# Patient Record
Sex: FEMALE | Race: WHITE | Employment: PART TIME | URBAN - NONMETROPOLITAN AREA
[De-identification: names, ages, dates, MRNs, and addresses within clinical notes are randomized per-mention and may not be internally consistent; named-entity substitution may affect disease eponyms.]

---

## 2018-02-16 ENCOUNTER — OFFICE VISIT (OUTPATIENT)
Dept: FAMILY MEDICINE | Facility: OTHER | Age: 23
End: 2018-02-16
Attending: NURSE PRACTITIONER
Payer: COMMERCIAL

## 2018-02-16 VITALS
BODY MASS INDEX: 25.11 KG/M2 | DIASTOLIC BLOOD PRESSURE: 68 MMHG | HEART RATE: 76 BPM | WEIGHT: 175.38 LBS | TEMPERATURE: 97.7 F | SYSTOLIC BLOOD PRESSURE: 120 MMHG | HEIGHT: 70 IN

## 2018-02-16 DIAGNOSIS — H92.02 ACUTE EAR PAIN, LEFT: ICD-10-CM

## 2018-02-16 DIAGNOSIS — R07.0 THROAT PAIN: ICD-10-CM

## 2018-02-16 DIAGNOSIS — H66.002 LEFT ACUTE SUPPURATIVE OTITIS MEDIA: Primary | ICD-10-CM

## 2018-02-16 PROCEDURE — 99213 OFFICE O/P EST LOW 20 MIN: CPT | Performed by: NURSE PRACTITIONER

## 2018-02-16 RX ORDER — HYDROXYZINE HYDROCHLORIDE 50 MG/1
TABLET, FILM COATED ORAL
Refills: 0 | COMMUNITY
Start: 2018-01-31

## 2018-02-16 RX ORDER — AZITHROMYCIN 250 MG/1
TABLET, FILM COATED ORAL
Qty: 6 TABLET | Refills: 0 | Status: SHIPPED | OUTPATIENT
Start: 2018-02-16 | End: 2018-07-20

## 2018-02-16 RX ORDER — NORETHINDRONE ACETATE AND ETHINYL ESTRADIOL 1.5-30(21)
KIT ORAL
Refills: 3 | COMMUNITY
Start: 2018-02-14

## 2018-02-16 RX ORDER — INFLUENZA A VIRUS A/NEBRASKA/14/2019 (H1N1) ANTIGEN (MDCK CELL DERIVED, PROPIOLACTONE INACTIVATED), INFLUENZA A VIRUS A/DELAWARE/39/2019 (H3N2) ANTIGEN (MDCK CELL DERIVED, PROPIOLACTONE INACTIVATED), INFLUENZA B VIRUS B/SINGAPORE/INFTT-16-0610/2016 ANTIGEN (MDCK CELL DERIVED, PROPIOLACTONE INACTIVATED), INFLUENZA B VIRUS B/DARWIN/7/2019 ANTIGEN (MDCK CELL DERIVED, PROPIOLACTONE INACTIVATED) 15; 15; 15; 15 UG/.5ML; UG/.5ML; UG/.5ML; UG/.5ML
INJECTION, SUSPENSION INTRAMUSCULAR
Refills: 0 | COMMUNITY
Start: 2017-11-17

## 2018-02-16 NOTE — MR AVS SNAPSHOT
"              After Visit Summary   2/16/2018    Salome Resendiz    MRN: [id number]           Patient Information     Date Of Birth          1995        Visit Information        Provider Department      2/16/2018 10:15 AM Slime Mathew NP [facility]        Today's Diagnoses     Acute ear pain, left    -  1    Left acute suppurative otitis media        Throat pain          Care Instructions    Azithromycin daily x 5 days    Tylenol or Ibuprofen as needed    Honey for throat pain    Salt water gargles for throat pain    Follow up if worsening or concerns          Follow-ups after your visit        Who to contact     If you have questions or need follow up information about today's clinic visit or your schedule please contact [facility] directly at [phone].  Normal or non-critical lab and imaging results will be communicated to you by [employer], letter or phone within 4 business days after the clinic has received the results. If you do not hear from us within 7 days, please contact the clinic through [employer] or phone. If you have a critical or abnormal lab result, we will notify you by phone as soon as possible.  Submit refill requests through [employer] or call your pharmacy and they will forward the refill request to us. Please allow 3 business days for your refill to be completed.          Additional Information About Your Visit        [employer] Information     [employer] lets you send messages to your doctor, view your test results, renew your prescriptions, schedule appointments and more. To sign up, go to [email or web address] . Click on \"Log in\" on the left side of the screen, which will take you to the Welcome page. Then click on \"Sign up Now\" on the right side of the page.     You will be asked to enter the access code listed below, as well as some personal information. Please follow the directions to create your username and password.     Your access code is: " [email or web address]  Expires: 2018 10:53 AM     Your access code will  in 90 days. If you need help or a new code, please call your [facility] clinic or [phone].        Care EveryWhere ID     This is your Care EveryWhere ID. This could be used by other organizations to access your [facility] medical records  [id number]        Your Vitals Were     Pulse Temperature Height BMI (Body Mass Index)          76 97.7  F (36.5  C) (Tympanic) 5' 9.75\" (1.772 m) 25.34 kg/m2         Blood Pressure from Last 3 Encounters:   18 120/68    Weight from Last 3 Encounters:   18 175 lb 6 oz (79.5 kg)              Today, you had the following     No orders found for display         Today's Medication Changes          These changes are accurate as of 18 10:53 AM.  If you have any questions, ask your nurse or doctor.               Start taking these medicines.        Dose/Directions    azithromycin 250 MG tablet   Commonly known as:  ZITHROMAX   Used for:  Left acute suppurative otitis media   Started by:  Slime Mathew NP        Two tablets first day, then one tablet daily for four days.   Quantity:  6 tablet   Refills:  0            Where to get your medicines      These medications were sent to [facility] [address] [facility], [place] - [address]  [address], [facility] [address]     Phone:  [phone]     azithromycin 250 MG tablet                Primary Care Provider Fax #    Physician No Ref-Primary [phone]       No address on file        Equal Access to Services     [facility]: Hadii greg river Sosuzanne, waaxda luqadaha, qaybta kaalmada yaritza medrano . So [facility] [phone].    ATENCIÓN: Si habla español, tiene a fried disposición servicios gratuitos de asistencia lingüística. Llame al [phone].    We comply with applicable federal civil rights laws and [place] laws. We do not discriminate on the basis of " race, color, national origin, age, disability, sex, sexual orientation, or gender identity.            Thank you!     Thank you for choosing [facility]  for your care. Our goal is always to provide you with excellent care. Hearing back from our patients is one way we can continue to improve our services. Please take a few minutes to complete the written survey that you may receive in the mail after your visit with us. Thank you!             Your Updated Medication List - Protect others around you: Learn how to safely use, store and throw away your medicines at www.disposemymeds.org.          This list is accurate as of 2/16/18 10:53 AM.  Always use your most recent med list.                   Brand Name Dispense Instructions for use Diagnosis    azithromycin 250 MG tablet    ZITHROMAX    6 tablet    Two tablets first day, then one tablet daily for four days.    Left acute suppurative otitis media       BLISOVI FE 1.5/30 1.5-30 MG-MCG per tablet   Generic drug:  norethindrone-ethinyl estradiol-iron      TK 1 T PO QD        FLUCELVAX QUADRIVALENT 0.5 ML Marsha   Generic drug:  Influenza Vac Subunit Quad      ADM 0.5ML IM UTD        FLUoxetine 20 MG capsule    PROzac     TK ONE C PO ONCE D        hydrOXYzine 50 MG tablet    ATARAX     TK ONE T PO HS PRA

## 2018-02-16 NOTE — NURSING NOTE
Patient presents to the clinic for left ear ache x 1 day and sore throat that started a few days ago. Patient reports no other symptoms.  Yesi CHAVEZ CMA.......2/16/2018..10:24 AM

## 2018-02-16 NOTE — PATIENT INSTRUCTIONS
Azithromycin daily x 5 days    Tylenol or Ibuprofen as needed    Honey for throat pain    Salt water gargles for throat pain    Follow up if worsening or concerns

## 2018-02-16 NOTE — PROGRESS NOTES
"HPI:    Salome Resendiz is a 23 year old female  who presents to clinic today for sore throat and ear ache.  Sore throat for a few days. Painful to swallow.   Left ear ache started during the night.  Mild occasional cough.  Intermittent mild runny/stuffy nose.  Mild headaches.  No body aches.  No fevers or chills.  Normal appetite.  No nausea or vomiting.  Energy mildly decreased today.  No tylenol or ibuprofen.              History reviewed. No pertinent past medical history.  History reviewed. No pertinent surgical history.  Social History   Substance Use Topics     Smoking status: Never Smoker     Smokeless tobacco: Never Used     Alcohol use Yes      Comment: socially     Current Outpatient Prescriptions   Medication Sig Dispense Refill     FLUoxetine (PROZAC) 20 MG capsule TK ONE C PO ONCE D  0     hydrOXYzine (ATARAX) 50 MG tablet TK ONE T PO HS PRA  0     FLUCELVAX QUADRIVALENT 0.5 ML NUBIA ADM 0.5ML IM UTD  0     BLISOVI FE 1.5/30 1.5-30 MG-MCG per tablet TK 1 T PO QD  3     Allergies   Allergen Reactions     Amoxicillin Rash         Past medical history, past surgical history, current medications and allergies reviewed and accurate to the best of my knowledge.        ROS:  Refer to HPI    /68 (BP Location: Right arm, Patient Position: Sitting, Cuff Size: Adult Regular)  Pulse 76  Temp 97.7  F (36.5  C) (Tympanic)  Ht 5' 9.75\" (1.772 m)  Wt 175 lb 6 oz (79.5 kg)  BMI 25.34 kg/m2    EXAM:  General Appearance: Well appearing female adolescent, appropriate appearance for age. No acute distress  Head: normocephalic, atraumatic  Ears: Left TM intact with decreased bony landmarks appreciated, erythema with dull effusion with mild bulging.  Right TM grey, translucent with bony landmarks appreciated, no erythema, no effusion, no bulging, no purulence.  Left auditory canal clear.  Right auditory canal clear.  Normal external ears, non tender.  Eyes: conjunctivae normal without erythema or irritation, no " drainage or crusting, no eyelid swelling, pupils equal   Orophayrnx: moist mucous membranes, posterior pharynx without erythema, tonsils surgically absent, no post nasal drip seen.    Neck: bilateral tonsillar/anterior cervical lymph nodes with enlargement  Respiratory: normal chest wall and respirations.  Normal effort.  Clear to auscultation bilaterally, no wheezing, crackles or rhonchi.  No increased work of breathing.  No cough appreciated.  Cardiac: RRR with no murmurs  Musculoskeletal:  Normal gait.  Equal movement of bilateral upper extremities.  Equal movement of bilateral lower extremities.    Dermatological: no rashes noted of exposed skin  Psychological: normal affect, alert and pleasant          ASSESSMENT/PLAN:  1. Acute ear pain, left -  Left acute suppurative otitis media    - azithromycin (ZITHROMAX) 250 MG tablet; Two tablets first day, then one tablet daily for four days.  Dispense: 6 tablet; Refill: 0    Tylenol or Ibuprofen PRN    Follow up if symptoms persist or worsening      2. Throat pain    Appears viral on exam    Encouraged fluids  Symptomatic treatment - salt water gargles, honey, elevation, humidifier,  lozenges, etc   Tylenol or ibuprofen PRN    Follow up if symptoms persist or worsen or concerns

## 2018-07-20 ENCOUNTER — HOSPITAL ENCOUNTER (EMERGENCY)
Facility: CLINIC | Age: 23
Discharge: HOME | End: 2018-07-20
Attending: NURSE PRACTITIONER | Admitting: NURSE PRACTITIONER
Payer: COMMERCIAL

## 2018-07-20 VITALS
DIASTOLIC BLOOD PRESSURE: 78 MMHG | RESPIRATION RATE: 18 BRPM | HEART RATE: 69 BPM | SYSTOLIC BLOOD PRESSURE: 118 MMHG | OXYGEN SATURATION: 99 % | TEMPERATURE: 98.3 F

## 2018-07-20 DIAGNOSIS — L24.9 IRRITANT CONTACT DERMATITIS, UNSPECIFIED TRIGGER: Primary | ICD-10-CM

## 2018-07-20 PROCEDURE — G0463 HOSPITAL OUTPT CLINIC VISIT: HCPCS | Performed by: NURSE PRACTITIONER

## 2018-07-20 PROCEDURE — 99213 OFFICE O/P EST LOW 20 MIN: CPT | Mod: Z6 | Performed by: NURSE PRACTITIONER

## 2018-07-20 RX ORDER — DESONIDE 0.5 MG/ML
LOTION TOPICAL 2 TIMES DAILY
Qty: 118 ML | Refills: 0 | Status: SHIPPED | OUTPATIENT
Start: 2018-07-20

## 2018-07-20 NOTE — ED AVS SNAPSHOT
" [facility]    [address]    [facility] [address]    Phone:  [phone]    Fax:  [phone]                                       Salome Resendiz   MRN: [id number]    Department:  [facility]   Date of Visit:  7/20/2018           Patient Information     Date Of Birth          1995        Your diagnoses for this visit were:     Irritant contact dermatitis, unspecified trigger        You were seen by Christine Mcguire APRN CNP.      Follow-up Information     Follow up with No Ref-Primary, Physician In 3 days.    Why:  For wound re-check        Discharge Instructions         Contact Dermatitis  Contact dermatitis is a skin rash caused by something that touches the skin and makes it irritated and inflamed. Your skin may be red, swollen, dry, and may be cracked. Blisters may form and ooze. The rash will itch.  Contact dermatitis can form on the face and neck, backs of hands, forearms, genitals, and lower legs.  People can get contact dermatitis from lots of sources. These include:    Plants such as poison ivy, oak, or sumac    Chemicals in hair dyes and rinses, soaps, solvents, waxes, fingernail polish, and deodorants     Jewelry or watchbands made of nickel  Contact dermatitis is not passed from person to person.  Talk with your healthcare provider about what may have caused the rash. A type of allergy testing called \"patch testing\" may be used to discover what you are allergic to. You will need to avoid the source of your rash in the future to prevent it from coming back.  Treatment is done to relieve itching and prevent the rash from coming back. The rash should go away in a few days to a few weeks.  Home care  Your healthcare provider may prescribe medicine to relieve swelling and itching. Follow all instructions when using these medicines.  General care:    Avoid anything that heats up your skin, such as hot showers or baths, or direct sunlight. This " can make itching worse.    Apply cold compresses to soothe your sores to help relieve your symptoms. Do this for 30 minutes 3 to 4 times a day. You can make a cold compress by soaking a cloth in cold water. Squeeze out excess water. You can add colloidal oatmeal to the water to help reduce itching. For severe itching in a small area, apply an ice pack wrapped in a thin towel. Do this for 20 minutes 3 to 4 times a day.    You can also try wet dressings. One way to do this is to wear a wet piece of clothing under a dry one. Wear a damp shirt under a dry shirt if your upper body is affected. This can relieve itching and prevent you from scratching the affected area.    You can also help relieve large areas of itching by taking a lukewarm bath with colloidal oatmeal added to the water.    Use hydrocortisone cream for redness and irritation, unless another medicine was prescribed. You can also use benzocaine anesthetic cream or spray. Calamine lotion can also relieve mild symptoms.    Use oral diphenhydramine to help reduce itching. You can buy this antihistamine at drug and grocery stores. It can make you sleepy, so use lower doses during the daytime. Or you can use loratadine. This is an antihistamine that will not make you sleepy. Do not use diphenhydramine if you have glaucoma or have trouble urinating due to an enlarged prostate.    If a plant causes your rash, make sure to wash your skin and the clothes you were wearing when you came into contact with the plant. This is to wash away the plant oils that gave you the rash and prevent more or worse symptoms.    Stay away from the substance or object that causes your symptoms. If you can t avoid it, wear gloves or some other type of protection.  Follow-up care  Follow up with your healthcare provider, or as advised.  When to seek medical advice  Call your healthcare provider right away if any of these occur:    Spreading of the rash to other parts of your  body    Severe swelling of your face, eyelids, mouth, throat or tongue    Trouble urinating due to swelling in the genital area    Fever of 100.4 F (38 C) or higher    Redness or swelling that gets worse    Pain that gets worse    Foul-smelling fluid leaking from the skin    Yellow-brown crusts on the open blisters  Date Last Reviewed: 9/1/2016 2000-2017 [employer]. [address] [place] [address], [place] [address]. All rights reserved. This information is not intended as a substitute for professional medical care. Always follow your healthcare professional's instructions.          24 Hour Appointment Hotline       To make an appointment at any [facility] clinic, call [phone] ([phone]). If you don't have a family doctor or clinic, we will help you find one. [facility] clinics are conveniently located to serve the needs of you and your family.             Review of your medicines      START taking        Dose / Directions Last dose taken    desonide 0.05 % lotion   Commonly known as:  DESOWEN   Quantity:  118 mL        Apply topically 2 times daily   Refills:  0          Our records show that you are taking the medicines listed below. If these are incorrect, please call your family doctor or clinic.        Dose / Directions Last dose taken    BLISOVI FE 1.5/30 1.5-30 MG-MCG per tablet   Generic drug:  norethindrone-ethinyl estradiol-iron        TK 1 T PO QD   Refills:  3        FLUCELVAX QUADRIVALENT 0.5 ML Marsha   Generic drug:  Influenza Vac Subunit Quad        ADM 0.5ML IM UTD   Refills:  0        FLUoxetine 20 MG capsule   Commonly known as:  PROzac        TK ONE C PO ONCE D   Refills:  0        hydrOXYzine 50 MG tablet   Commonly known as:  ATARAX        TK ONE T PO HS PRA   Refills:  0                Prescriptions were sent or printed at these locations (1 Prescription)                   [facility] [place], [place] - [address]   [address] [facility] [address]     "Telephone:  [phone]   Fax:  [phone]   Hours:                  E-Prescribed (1 of 1)         desonide (DESOWEN) 0.05 % lotion                Orders Needing Specimen Collection     None      Pending Results     No orders found from 2018 to 2018.            Pending Culture Results     No orders found from 2018 to 2018.            Pending Results Instructions     If you had any lab results that were not finalized at the time of your Discharge, you can call the ED Lab Result RN at [phone]. You will be contacted by this team for any positive Lab results or changes in treatment. The nurses are available 7 days a week from 10A to 6:30P.  You can leave a message 24 hours per day and they will return your call.        Test Results From Your Hospital Stay               Thank you for choosing [facility]       Thank you for choosing [facility] for your care. Our goal is always to provide you with excellent care. Hearing back from our patients is one way we can continue to improve our services. Please take a few minutes to complete the written survey that you may receive in the mail after you visit with us. Thank you!        [employer] Information     [facility] lets you send messages to your doctor, view your test results, renew your prescriptions, schedule appointments and more. To sign up, go to [email or web address] . Click on \"Log in\" on the left side of the screen, which will take you to the Welcome page. Then click on \"Sign up Now\" on the right side of the page.     You will be asked to enter the access code listed below, as well as some personal information. Please follow the directions to create your username and password.     Your access code is: [email or web address]  Expires: 10/18/2018  8:28 PM     Your access code will  in 90 days. If you need help or a new code, please call your [facility] clinic or [phone].        Care EveryWhere ID     This is your Care EveryWhere ID. This could be used " by other organizations to access your [facility] medical records  [id number]        Equal Access to Services     DIDI BREWER : Vladimir Leon, joni delacruz, pedro medrano, yaritza rosen. So [facility] [phone].    ATENCIÓN: Si habla español, tiene a fried disposición servicios gratuitos de asistencia lingüística. Llame al [phone].    We comply with applicable federal civil rights laws and [place] laws. We do not discriminate on the basis of race, color, national origin, age, disability, sex, sexual orientation, or gender identity.            After Visit Summary       This is your record. Keep this with you and show to your community pharmacist(s) and doctor(s) at your next visit.

## 2018-07-20 NOTE — ED AVS SNAPSHOT
[facility]    [address] [facility] [address]    Phone:  [phone]    Fax:  [phone]                                       Salome Resendiz   MRN: [id number]    Department:  [facility]   Date of Visit:  7/20/2018           After Visit Summary Signature Page     I have received my discharge instructions, and my questions have been answered. I have discussed any challenges I see with this plan with the nurse or doctor.    ..........................................................................................................................................  Patient/Patient Representative Signature      ..........................................................................................................................................  Patient Representative Print Name and Relationship to Patient    ..................................................               ................................................  Date                                            Time    ..........................................................................................................................................  Reviewed by Signature/Title    ...................................................              ..............................................  Date                                                            Time

## 2018-07-21 ENCOUNTER — HOSPITAL ENCOUNTER (EMERGENCY)
Facility: CLINIC | Age: 23
Discharge: HOME | End: 2018-07-21
Attending: EMERGENCY MEDICINE | Admitting: EMERGENCY MEDICINE
Payer: COMMERCIAL

## 2018-07-21 VITALS
DIASTOLIC BLOOD PRESSURE: 73 MMHG | SYSTOLIC BLOOD PRESSURE: 108 MMHG | HEART RATE: 78 BPM | TEMPERATURE: 98 F | RESPIRATION RATE: 20 BRPM | OXYGEN SATURATION: 100 %

## 2018-07-21 DIAGNOSIS — L50.9 URTICARIAL RASH: ICD-10-CM

## 2018-07-21 DIAGNOSIS — T78.40XA ALLERGIC REACTION, INITIAL ENCOUNTER: ICD-10-CM

## 2018-07-21 PROCEDURE — 25000132 ZZH RX MED GY IP 250 OP 250 PS 637: Performed by: EMERGENCY MEDICINE

## 2018-07-21 PROCEDURE — 99284 EMERGENCY DEPT VISIT MOD MDM: CPT | Mod: Z6 | Performed by: EMERGENCY MEDICINE

## 2018-07-21 PROCEDURE — 99283 EMERGENCY DEPT VISIT LOW MDM: CPT | Performed by: EMERGENCY MEDICINE

## 2018-07-21 PROCEDURE — 25000125 ZZHC RX 250: Performed by: EMERGENCY MEDICINE

## 2018-07-21 RX ORDER — PREDNISONE 20 MG/1
40 TABLET ORAL DAILY
Qty: 10 TABLET | Refills: 0 | Status: SHIPPED | OUTPATIENT
Start: 2018-07-21 | End: 2018-07-26

## 2018-07-21 RX ORDER — PREDNISONE 20 MG/1
60 TABLET ORAL ONCE
Status: COMPLETED | OUTPATIENT
Start: 2018-07-21 | End: 2018-07-21

## 2018-07-21 RX ORDER — DIPHENHYDRAMINE HCL 25 MG
50 CAPSULE ORAL ONCE
Status: COMPLETED | OUTPATIENT
Start: 2018-07-21 | End: 2018-07-21

## 2018-07-21 RX ADMIN — DIPHENHYDRAMINE HYDROCHLORIDE 50 MG: 25 CAPSULE ORAL at 11:45

## 2018-07-21 RX ADMIN — PREDNISONE 60 MG: 20 TABLET ORAL at 11:45

## 2018-07-21 NOTE — ED AVS SNAPSHOT
[facility]    [address] [facility] [address] [facility] [address]    Phone:  [phone]    Fax:  [phone]                                       Salome Resendiz   MRN: [id number]    Department:  [facility]   Date of Visit:  7/21/2018           After Visit Summary Signature Page     I have received my discharge instructions, and my questions have been answered. I have discussed any challenges I see with this plan with the nurse or doctor.    ..........................................................................................................................................  Patient/Patient Representative Signature      ..........................................................................................................................................  Patient Representative Print Name and Relationship to Patient    ..................................................               ................................................  Date                                            Time    ..........................................................................................................................................  Reviewed by Signature/Title    ...................................................              ..............................................  Date                                                            Time

## 2018-07-21 NOTE — ED AVS SNAPSHOT
[facility]    [address] [facility] [address]    Phone:  [phone]    Fax:  [phone]                                       Salome Resendiz   MRN: [id number]    Department:  [facility]   Date of Visit:  7/21/2018           Patient Information     Date Of Birth          1995        Your diagnoses for this visit were:     Urticarial rash     Allergic reaction, initial encounter        You were seen by Tyler Teixeira MD.      Follow-up Information     Follow up with No Ref-Primary, Physician.    Why:  Next week for recheck        Follow up with [facility].    Specialty:  EMERGENCY MEDICINE    Why:  If symptoms worsen    Contact information:    [address] [place] [id number] [phone]    Additional information:    The medical center is located at   [address] [facility]. (between [address] and   [address] in [place], [place] miles north   of [place]).        Discharge Instructions       Return if symptoms worsen or new symptoms develop.  Follow-up primary care physician next available.  Drink plenty of fluids.  If increased rash difficulty breathing shortness of breath throat swelling or other symptoms present please return for further evaluation and care.  Understanding Hives (Urticaria)  Urticaria (hives) are red, itchy, and swollen areas on the skin. They are most often an allergic reaction from eating a food or taking a medicine. Sometimes the cause may be unknown. A single hive can vary in size from a half inch to several inches. Hives can appear all over the body. Or they may appear on only one part of the body.  What causes hives?  Hives can be caused by food and beverages such as:    Tree nuts (almonds, walnuts, hazelnuts)    Peanuts    Eggs    Shellfish    Milk  Hives can also be caused by medicines such as:    Antibiotics, especially penicillin and sulfa-based medicines     Anticonvulsant or  antiseizure medicines     Chemotherapy medicines   Other causes of hives include:    Infection or virus    Heat    Cold air or cold water    Exercise    Scratching or rubbing your skin, or wearing tight-fitting clothes that rub your skin    Being exposed to sunlight or light from a light bulb, in rare cases    Inhaled-chemicals in the environment from foods and drugs, insects, plants, or other sources  In some cases, hives may occur again and again with no specific cause.  If you have hives    Stay away from the food, drink, medicine, or other thing that may be causing the hives.    Ask your healthcare provider how to control itchy or irritated skin.    Talk with your healthcare provider right away if you think a medicine gave you hives.  Watch for anaphylaxis  If you have hives, watch for symptoms of a severe reaction that can affect your entire body. This is called anaphylaxis. Symptoms can include swollen areas of the body, wheezing, trouble breathing or swallowing, and a hoarse voice. This reaction may happen right away. Or it may happen in an hour or more. In extreme cases, the airways from mouth to lungs may swell and make breathing difficult. This is a medical emergency. Use epinephrine medicine if you have it, and call 911 or go to the emergency room.     When to call your healthcare provider  Call your healthcare provider if:    Your hives feel uncomfortable    You have never had hives before    Your symptoms don't go away or come back    Your symptoms get worse or new symptoms develop such as:   ? Sneezing, coughing, runny or stuffy nose  ? Itching of the eyes, nose, or roof of the mouth  ? Itching, burning, stinging, or pain  ? Dry, flaky, cracking, or scaly skin  ? Red or purple spots  Call 911  Call 911 right away if you have:    Swelling in your lips, tongue, or throat, called angioedema    Drooling    Trouble breathing, talking, or swallowing    Cool, moist or pale (blue in color) skin    Fast and  weak heartbeat    Wheezing or short of breath    Feeling lightheaded or confused    Diarrhea    Severe nausea or vomiting    Seizure    Feeling dizzy or weak, or a sudden drop in blood pressure   Date Last Reviewed: 4/1/2017 2000-2017 [employer]. [address] [place] [address], [place] [address]. All rights reserved. This information is not intended as a substitute for professional medical care. Always follow your healthcare professional's instructions.          24 Hour Appointment Hotline       To make an appointment at any [facility], call [phone] ([phone]). If you don't have a family doctor or clinic, we will help you find one. [place] clinics are conveniently located to serve the needs of you and your family.             Review of your medicines      START taking        Dose / Directions Last dose taken    predniSONE 20 MG tablet   Commonly known as:  DELTASONE   Dose:  40 mg   Quantity:  10 tablet        Take 2 tablets (40 mg) by mouth daily for 5 days   Refills:  0          Our records show that you are taking the medicines listed below. If these are incorrect, please call your family doctor or clinic.        Dose / Directions Last dose taken    BLISOVI FE 1.5/30 1.5-30 MG-MCG per tablet   Generic drug:  norethindrone-ethinyl estradiol-iron        TK 1 T PO QD   Refills:  3        desonide 0.05 % lotion   Commonly known as:  DESOWEN   Quantity:  118 mL        Apply topically 2 times daily   Refills:  0        FLUCELVAX QUADRIVALENT 0.5 ML Marsha   Generic drug:  Influenza Vac Subunit Quad        ADM 0.5ML IM UTD   Refills:  0        FLUoxetine 20 MG capsule   Commonly known as:  PROzac        TK ONE C PO ONCE D   Refills:  0        hydrOXYzine 50 MG tablet   Commonly known as:  ATARAX        TK ONE T PO HS PRA   Refills:  0                Prescriptions were sent or printed at these locations (1 Prescription)                   Other Prescriptions                Printed at  "Department/Unit printer (1 of 1)         predniSONE (DELTASONE) 20 MG tablet                Orders Needing Specimen Collection     None      Pending Results     No orders found from 2018 to 2018.            Pending Culture Results     No orders found from 2018 to 2018.            Pending Results Instructions     If you had any lab results that were not finalized at the time of your Discharge, you can call the ED Lab Result RN at [phone]. You will be contacted by this team for any positive Lab results or changes in treatment. The nurses are available 7 days a week from 10A to 6:30P.  You can leave a message 24 hours per day and they will return your call.        Test Results From Your Hospital Stay               Thank you for choosing [facility]       Thank you for choosing [facility] for your care. Our goal is always to provide you with excellent care. Hearing back from our patients is one way we can continue to improve our services. Please take a few minutes to complete the written survey that you may receive in the mail after you visit with us. Thank you!        [employer]"2,10E+07" Information     [employer] lets you send messages to your doctor, view your test results, renew your prescriptions, schedule appointments and more. To sign up, go to [email or web address] . Click on \"Log in\" on the left side of the screen, which will take you to the Welcome page. Then click on \"Sign up Now\" on the right side of the page.     You will be asked to enter the access code listed below, as well as some personal information. Please follow the directions to create your username and password.     Your access code is: [email or web address]  Expires: 10/18/2018  8:28 PM     Your access code will  in 90 days. If you need help or a new code, please call your [facility] clinic or [phone].        Care EveryWhere ID     This is your Care EveryWhere ID. This could be used by other organizations to access your [facility] medical " records  [id number]        Equal Access to Services     DIDI BREWER : Vladimir Leon, joni delacruz, pedro medrano, yaritza rosen. So [facility] [phone].    ATENCIÓN: Si habla español, tiene a fried disposición servicios gratuitos de asistencia lingüística. Llame al [phone].    We comply with applicable federal civil rights laws and [place] laws. We do not discriminate on the basis of race, color, national origin, age, disability, sex, sexual orientation, or gender identity.            After Visit Summary       This is your record. Keep this with you and show to your community pharmacist(s) and doctor(s) at your next visit.

## 2018-07-21 NOTE — ED PROVIDER NOTES
History     Chief Complaint   Patient presents with     Rash     head, neck and face.     HPI  Salome Resendiz is a 23 year old female who presents with concerns of rash noted today on face.  Pt reports she has also noticed itching in the scalp for the past two weeks.   Patient reports that she has purchased a new bottle of shampoo that is a different style but the same brand 1 or 2 weeks ago.  Pt states she has started vitamins recently but denies any new lotions or creams.  Pt denies contact with any poison ivy.  Pt denies hx of sensitive skin.  Pt denies any new contacts with animals.  Pt denies fever, aches, chills, sweats, nausea, vomiting, abdominal pain, dysuria, change in respiratory patterns, chest pain, upper respiratory problems.     Problem List:    There are no active problems to display for this patient.       Past Medical History:    No past medical history on file.    Past Surgical History:    No past surgical history on file.    Family History:    No family history on file.    Social History:  Marital Status:  Single [1]  Social History   Substance Use Topics     Smoking status: Never Smoker     Smokeless tobacco: Never Used     Alcohol use Yes      Comment: socially        Medications:      desonide (DESOWEN) 0.05 % lotion   BLISOVI FE 1.5/30 1.5-30 MG-MCG per tablet   FLUCELVAX QUADRIVALENT 0.5 ML NUBIA   FLUoxetine (PROZAC) 20 MG capsule   hydrOXYzine (ATARAX) 50 MG tablet     Review of Systems  As mentioned above in the history present illness. All other systems were reviewed and are negative.    Physical Exam   BP: 118/78  Pulse: 69  Temp: 98.3  F (36.8  C)  Resp: 18  SpO2: 99 %      Physical Exam   Constitutional: She appears well-developed and well-nourished. She appears distressed (anxious).   HENT:   Head: Normocephalic and atraumatic.   Cardiovascular: Normal rate, regular rhythm and normal heart sounds.  Exam reveals no gallop and no friction rub.    No murmur heard.  Pulmonary/Chest:  Effort normal and breath sounds normal. No respiratory distress. She has no wheezes. She has no rales. She exhibits no tenderness.   Neurological: She is alert.   Skin: Skin is warm. Rash noted. Rash is papular (small papular erythematous scattered rash noted within the scalp and more prominent behind bilateral ears and above eyebrows without weeping or oozing or pain). She is not diaphoretic.   Nursing note and vitals reviewed.      ED Course     ED Course     Procedures    No results found for this or any previous visit (from the past 24 hour(s)).    Medications - No data to display    Assessments & Plan (with Medical Decision Making)     I have reviewed the nursing notes.    I have reviewed the findings, diagnosis, plan and need for follow up with the patient.  Salome Resendzi is a 23 year old female who presents with concerns of rash noted today on face.  Pt reports she has also noticed itching in the scalp for the past two weeks.   Patient reports that she has purchased a new bottle of shampoo that is a different style but the same brand 1 or 2 weeks ago.  Pt states she has started vitamins recently but denies any new lotions or creams.  Pt denies contact with any poison ivy.  Pt denies hx of sensitive skin.  Pt denies any new contacts with animals.  Pt denies fever, aches, chills, sweats, nausea, vomiting, abdominal pain, dysuria, change in respiratory patterns, chest pain, upper respiratory problems.   Exam as noted above.  Patient has no history of lice and therefore unlikely to be this.  Is unlikely presentation for bedbugs.  Based upon presentation there is there is a small possibility of tinea capitis but is intensely pruritic and therefore it is likely to be a contact dermatitis unlikely to be shampoo but cannot exclude any other irritants are unknown.  Explained these findings to the patient.  Prescribed desonide lotion and this was not available by the pharmacist but then did prescribe Lidex in place of  desonide.  Recommend patient follow-up with her primary care provider next week to do a follow-up exam and the lotion and made sure that it is working.  Patient David has a prescription of hydroxyzine and explained her that she could use this 3 times a day as needed.  Patient verbalizes understanding was discharged in stable condition    Discharge Medication List as of 7/20/2018  8:29 PM      START taking these medications    Details   desonide (DESOWEN) 0.05 % lotion Apply topically 2 times dailyDisp-118 mL, [id number]             Final diagnoses:   Irritant contact dermatitis, unspecified trigger       7/20/2018   [facility] EMERGENCY DEPARTMENT     Christine Mcguire, VERONA CNP  07/20/18 0570

## 2018-07-21 NOTE — ED NOTES
Pt was seen in UC yesterday for rash on forehead.   Pt was given steroid cream.   Pt placed cream on forehead before bed last night and forehead is clear but now rash has spread all over other parts of the body with itching.   Pt has not tried applying cream to other areas.

## 2018-07-21 NOTE — DISCHARGE INSTRUCTIONS
Return if symptoms worsen or new symptoms develop.  Follow-up primary care physician next available.  Drink plenty of fluids.  If increased rash difficulty breathing shortness of breath throat swelling or other symptoms present please return for further evaluation and care.  Understanding Hives (Urticaria)  Urticaria (hives) are red, itchy, and swollen areas on the skin. They are most often an allergic reaction from eating a food or taking a medicine. Sometimes the cause may be unknown. A single hive can vary in size from a half inch to several inches. Hives can appear all over the body. Or they may appear on only one part of the body.  What causes hives?  Hives can be caused by food and beverages such as:    Tree nuts (almonds, walnuts, hazelnuts)    Peanuts    Eggs    Shellfish    Milk  Hives can also be caused by medicines such as:    Antibiotics, especially penicillin and sulfa-based medicines     Anticonvulsant or antiseizure medicines     Chemotherapy medicines   Other causes of hives include:    Infection or virus    Heat    Cold air or cold water    Exercise    Scratching or rubbing your skin, or wearing tight-fitting clothes that rub your skin    Being exposed to sunlight or light from a light bulb, in rare cases    Inhaled-chemicals in the environment from foods and drugs, insects, plants, or other sources  In some cases, hives may occur again and again with no specific cause.  If you have hives    Stay away from the food, drink, medicine, or other thing that may be causing the hives.    Ask your healthcare provider how to control itchy or irritated skin.    Talk with your healthcare provider right away if you think a medicine gave you hives.  Watch for anaphylaxis  If you have hives, watch for symptoms of a severe reaction that can affect your entire body. This is called anaphylaxis. Symptoms can include swollen areas of the body, wheezing, trouble breathing or swallowing, and a hoarse voice. This  reaction may happen right away. Or it may happen in an hour or more. In extreme cases, the airways from mouth to lungs may swell and make breathing difficult. This is a medical emergency. Use epinephrine medicine if you have it, and call 911 or go to the emergency room.     When to call your healthcare provider  Call your healthcare provider if:    Your hives feel uncomfortable    You have never had hives before    Your symptoms don't go away or come back    Your symptoms get worse or new symptoms develop such as:   ? Sneezing, coughing, runny or stuffy nose  ? Itching of the eyes, nose, or roof of the mouth  ? Itching, burning, stinging, or pain  ? Dry, flaky, cracking, or scaly skin  ? Red or purple spots  Call 911  Call 911 right away if you have:    Swelling in your lips, tongue, or throat, called angioedema    Drooling    Trouble breathing, talking, or swallowing    Cool, moist or pale (blue in color) skin    Fast and weak heartbeat    Wheezing or short of breath    Feeling lightheaded or confused    Diarrhea    Severe nausea or vomiting    Seizure    Feeling dizzy or weak, or a sudden drop in blood pressure   Date Last Reviewed: 4/1/2017 2000-2017 [employer]. [address] [place] [address], [place] [address]. All rights reserved. This information is not intended as a substitute for professional medical care. Always follow your healthcare professional's instructions.

## 2018-07-21 NOTE — DISCHARGE INSTRUCTIONS
"  Contact Dermatitis  Contact dermatitis is a skin rash caused by something that touches the skin and makes it irritated and inflamed. Your skin may be red, swollen, dry, and may be cracked. Blisters may form and ooze. The rash will itch.  Contact dermatitis can form on the face and neck, backs of hands, forearms, genitals, and lower legs.  People can get contact dermatitis from lots of sources. These include:    Plants such as poison ivy, oak, or sumac    Chemicals in hair dyes and rinses, soaps, solvents, waxes, fingernail polish, and deodorants     Jewelry or watchbands made of nickel  Contact dermatitis is not passed from person to person.  Talk with your healthcare provider about what may have caused the rash. A type of allergy testing called \"patch testing\" may be used to discover what you are allergic to. You will need to avoid the source of your rash in the future to prevent it from coming back.  Treatment is done to relieve itching and prevent the rash from coming back. The rash should go away in a few days to a few weeks.  Home care  Your healthcare provider may prescribe medicine to relieve swelling and itching. Follow all instructions when using these medicines.  General care:    Avoid anything that heats up your skin, such as hot showers or baths, or direct sunlight. This can make itching worse.    Apply cold compresses to soothe your sores to help relieve your symptoms. Do this for 30 minutes 3 to 4 times a day. You can make a cold compress by soaking a cloth in cold water. Squeeze out excess water. You can add colloidal oatmeal to the water to help reduce itching. For severe itching in a small area, apply an ice pack wrapped in a thin towel. Do this for 20 minutes 3 to 4 times a day.    You can also try wet dressings. One way to do this is to wear a wet piece of clothing under a dry one. Wear a damp shirt under a dry shirt if your upper body is affected. This can relieve itching and prevent you from " scratching the affected area.    You can also help relieve large areas of itching by taking a lukewarm bath with colloidal oatmeal added to the water.    Use hydrocortisone cream for redness and irritation, unless another medicine was prescribed. You can also use benzocaine anesthetic cream or spray. Calamine lotion can also relieve mild symptoms.    Use oral diphenhydramine to help reduce itching. You can buy this antihistamine at drug and grocery stores. It can make you sleepy, so use lower doses during the daytime. Or you can use loratadine. This is an antihistamine that will not make you sleepy. Do not use diphenhydramine if you have glaucoma or have trouble urinating due to an enlarged prostate.    If a plant causes your rash, make sure to wash your skin and the clothes you were wearing when you came into contact with the plant. This is to wash away the plant oils that gave you the rash and prevent more or worse symptoms.    Stay away from the substance or object that causes your symptoms. If you can t avoid it, wear gloves or some other type of protection.  Follow-up care  Follow up with your healthcare provider, or as advised.  When to seek medical advice  Call your healthcare provider right away if any of these occur:    Spreading of the rash to other parts of your body    Severe swelling of your face, eyelids, mouth, throat or tongue    Trouble urinating due to swelling in the genital area    Fever of 100.4 F (38 C) or higher    Redness or swelling that gets worse    Pain that gets worse    Foul-smelling fluid leaking from the skin    Yellow-brown crusts on the open blisters  Date Last Reviewed: 9/1/2016 2000-2017 [employer]. [address] [place] [address]. All rights reserved. This information is not intended as a substitute for professional medical care. Always follow your healthcare professional's instructions.

## 2018-07-21 NOTE — ED PROVIDER NOTES
History     Chief Complaint   Patient presents with     Rash     seen yesterday for rash.  only given ointment  states still spreading.. no swelling tongue or lips.  no c/o SOA     HPI  Salome Resendiz is a 23 year old female who presents to the emergency department today for evaluation of an urticarial rash. Patient woke up yesterday with a rash on her forehead and scalp. She was seen yesterday and prescribed desonide which she used only on her scalp and forehead yesterday. She did not use her shampoo when she showered yesterday as she was told this might be the cause of the rash. Today she woke up and the rash had spread to her arms and legs. Patient states that the rash is itchy.     Patient denies using any new lotions or shampoos, however she did recently buy a new bottle of shampoo but it is the same kind that she normally uses.    There is no problem list on file for this patient.    Current Outpatient Prescriptions   Medication Sig Dispense Refill     BLISOVI FE 1.5/30 1.5-30 MG-MCG per tablet TK 1 T PO QD  3     desonide (DESOWEN) 0.05 % lotion Apply topically 2 times daily 118 mL 0     FLUCELVAX QUADRIVALENT 0.5 ML NUBIA ADM 0.5ML IM UTD  0     FLUoxetine (PROZAC) 20 MG capsule TK ONE C PO ONCE D  0     hydrOXYzine (ATARAX) 50 MG tablet TK ONE T PO HS PRA  0     Allergies   Allergen Reactions     Amoxicillin Rash         Problem List:    There are no active problems to display for this patient.       Past Medical History:    No past medical history on file.    Past Surgical History:    No past surgical history on file.    Family History:    No family history on file.    Social History:  Marital Status:  Single [1]  Social History   Substance Use Topics     Smoking status: Never Smoker     Smokeless tobacco: Never Used     Alcohol use Yes      Comment: socially        Medications:      BLISOVI FE 1.5/30 1.5-30 MG-MCG per tablet   desonide (DESOWEN) 0.05 % lotion   FLUCELVAX QUADRIVALENT 0.5 ML NUBIA    FLUoxetine (PROZAC) 20 MG capsule   hydrOXYzine (ATARAX) 50 MG tablet         Review of Systems   Constitutional: Negative for activity change, appetite change, chills and fever.   HENT: Positive for facial swelling. Negative for congestion, sore throat and trouble swallowing.    Eyes: Negative for visual disturbance.   Respiratory: Negative for cough, shortness of breath and wheezing.    Cardiovascular: Negative for chest pain.   Gastrointestinal: Negative for abdominal pain, nausea and vomiting.   Genitourinary: Negative for dysuria.   Musculoskeletal: Negative for back pain and neck pain.   Skin: Positive for rash.   Neurological: Negative for dizziness, facial asymmetry, weakness, light-headedness, numbness and headaches.       Physical Exam   BP: 108/73  Pulse: 78  Temp: 98  F (36.7  C)  Resp: 20  SpO2: 100 %      Physical Exam   Constitutional: She appears well-developed and well-nourished. No distress.   Eyes: Conjunctivae are normal.   Psychiatric: She has a normal mood and affect.   Nursing note and vitals reviewed.    HENT: Oral mucosa moist. No lesions. Posterior pharynx no erythema or edema.  Mild swelling and rash noted to bilateral cheek region  Neck: Supple  Pulmonary/Chest: Lungs are clear to auscultation bilaterally.  Cardiovascular: Heart is regular rate and rhythm. No murmur.  Abdomen: Soft, non-distended, non-tender.   Musculoskeletal: Moving all extremities well. No peripheral edema.   Neurological: Alert. No focal neurologic deficit.   Skin: Positive urticarial rash on bilateral cheeks, arms, legs, abdomen, and back.    ED Course   11:22 AM Patient Assessed.     ED Course     Procedures               Critical Care time:  none                   Medications   predniSONE (DELTASONE) tablet 60 mg (60 mg Oral Given 7/21/18 1145)   diphenhydrAMINE (BENADRYL) capsule 50 mg (50 mg Oral Given 7/21/18 1145)       Assessments & Plan (with Medical Decision Making) records were reviewed.  Patient was  given prednisone and Benadryl orally.  She was observed for a couple of hours she slept.  On reexamination her rash is less than.  The hives are less raised and reddened.  Her cheeks are less swollen and she is feeling better.  She denies any throat swelling or shortness of breath.  She feels comfortable going home at this time.  She will be treated with prednisone for the next 5 days will take Benadryl as needed.  She will return if any shortness of breath worsening rash or other symptoms present.     I have reviewed the nursing notes.    I have reviewed the findings, diagnosis, plan and need for follow up with the patient.       Discharge Medication List as of 7/21/2018  2:03 PM      START taking these medications    Details   predniSONE (DELTASONE) 20 MG tablet Take 2 tablets (40 mg) by mouth daily for 5 days, Disp-10 tablet, R-0, Local Print             Final diagnoses:   Urticarial rash   Allergic reaction, initial encounter   This document serves as a record of the services and decisions personally performed and made by Tyler Teixeira MD. It was created on HIS/HER behalf by Indu Fishman, a trained medical scribe. The creation of this document is based on the provider's statements to the medical scribe.  Indu Fishman 11:33 AM 7/21/2018    Provider:  The information in this document, created by the medical scribe for me, accurately reflects the services I personally performed and the decisions made by me. I have reviewed and approved this document for accuracy prior to leaving the patient care area.  Tyler Teixeira MD 11:33 AM 7/21/2018 7/21/2018   [facility] EMERGENCY DEPARTMENT     Tyler Teixeira MD  07/23/18 0932

## 2018-07-23 ASSESSMENT — ENCOUNTER SYMPTOMS
VOMITING: 0
FACIAL ASYMMETRY: 0
HEADACHES: 0
FEVER: 0
COUGH: 0
ACTIVITY CHANGE: 0
FACIAL SWELLING: 1
LIGHT-HEADEDNESS: 0
TROUBLE SWALLOWING: 0
NECK PAIN: 0
DYSURIA: 0
ABDOMINAL PAIN: 0
WHEEZING: 0
WEAKNESS: 0
NUMBNESS: 0
SHORTNESS OF BREATH: 0
CHILLS: 0
DIZZINESS: 0
BACK PAIN: 0
APPETITE CHANGE: 0
NAUSEA: 0
SORE THROAT: 0

## 2018-12-23 ENCOUNTER — HOSPITAL ENCOUNTER (EMERGENCY)
Facility: CLINIC | Age: 23
Discharge: HOME | End: 2018-12-23
Attending: NURSE PRACTITIONER | Admitting: NURSE PRACTITIONER
Payer: COMMERCIAL

## 2018-12-23 VITALS
BODY MASS INDEX: 26.01 KG/M2 | DIASTOLIC BLOOD PRESSURE: 78 MMHG | RESPIRATION RATE: 16 BRPM | SYSTOLIC BLOOD PRESSURE: 133 MMHG | OXYGEN SATURATION: 98 % | HEART RATE: 81 BPM | TEMPERATURE: 98 F | WEIGHT: 180 LBS

## 2018-12-23 DIAGNOSIS — B34.9 VIRAL SYNDROME: ICD-10-CM

## 2018-12-23 LAB
INTERNAL QC OK POCT: YES
S PYO AG THROAT QL IA.RAPID: NEGATIVE

## 2018-12-23 PROCEDURE — 87880 STREP A ASSAY W/OPTIC: CPT | Performed by: NURSE PRACTITIONER

## 2018-12-23 PROCEDURE — G0463 HOSPITAL OUTPT CLINIC VISIT: HCPCS | Performed by: NURSE PRACTITIONER

## 2018-12-23 PROCEDURE — 99214 OFFICE O/P EST MOD 30 MIN: CPT | Mod: Z6 | Performed by: NURSE PRACTITIONER

## 2018-12-23 PROCEDURE — 87081 CULTURE SCREEN ONLY: CPT | Performed by: NURSE PRACTITIONER

## 2018-12-23 NOTE — ED TRIAGE NOTES
Patient presents today with sore throat . Symptoms started tuesday . Arrived to urgent care ambulatory . Requested strep test, but just had a drink of water so must wait 15 min.

## 2018-12-23 NOTE — ED PROVIDER NOTES
History     Chief Complaint   Patient presents with     Pharyngitis     ST 6 days     HPI    SUBJECTIVE: Salome Resendiz  is here today because of:Sore Throat  The patient has had symptoms of cough, earache, sore throat, nasal congestion/runny nose, sneezing and hoarseness.   Onset of symptoms was 6 days ago. Course of illness is same.  Patient denies exposure to illness at home or work/school.   Patient denies fever, vomiting, diarrhea, headache, sinus pain, chest congestion, wheezing, myalgias and shortness of breath, change in bowel or bladder habits, rashes  Treatment measures tried include Nyquil, Dayquil.  Patient is not a smoker    Problem List:    There are no active problems to display for this patient.       Past Medical History:    History reviewed. No pertinent past medical history.    Past Surgical History:    History reviewed. No pertinent surgical history.    Family History:    History reviewed. No pertinent family history.    Social History:  Marital Status:  Single [1]  Social History     Tobacco Use     Smoking status: Never Smoker     Smokeless tobacco: Never Used   Substance Use Topics     Alcohol use: Yes     Comment: socially     Drug use: No        Medications:      BLISOVI FE 1.5/30 1.5-30 MG-MCG per tablet   desonide (DESOWEN) 0.05 % lotion   FLUCELVAX QUADRIVALENT 0.5 ML NUBIA   FLUoxetine (PROZAC) 20 MG capsule   hydrOXYzine (ATARAX) 50 MG tablet       Review of Systems  As mentioned above in the history present illness. All other systems were reviewed and are negative.    Physical Exam   BP: 133/78  Pulse: 81  Temp: 98  F (36.7  C)  Resp: 16  Weight: 81.6 kg (180 lb)  SpO2: 98 %      Physical Exam  GENERAL: alert, no acute distress and no apparent distress  EYES:  Right conjunctiva is not injected and without discharge.  Left conjunctiva is not injected and without discharge.  EARS: Right TM is normal: no effusions, no erythema, and normal landmarks.  Left TM is normal: no effusions, no  erythema, and normal landmarks.  NOSE: Nasal mucosa is normal.  Sinus not tender.  THROAT: red/erythematous posterior pharynx and exudates are absent, uvula is midline, trismus is absent.  NECK: supple with shotty nodes  CARDIAC:NORMAL - regular rate and rhythm without murmur.  RESP: Normal - CTA without rales, rhonchi, or wheezing.  SKIN: normal      ED Course        Procedures  Results for orders placed or performed during the hospital encounter of 12/23/18 (from the past 24 hour(s))   Rapid strep group A screen POCT   Result Value Ref Range    Rapid Strep A Screen Negative neg    Internal QC OK Yes        Medications - No data to display    Assessments & Plan (with Medical Decision Making)     I have reviewed the nursing notes.    I have reviewed the findings, diagnosis, plan and need for follow up with the patient.  Medical Decision Making:  CXR is not indicated.  Rapid Strep test is indicated.     Assessment:  1) Viral pharyngitis and Viral syndrome.    PLAN:    Use Cepacol lozenges, delsym, increase fluids and rest.   Follow up with any questions or problems         Medication List      There are no discharge medications for this visit.         Final diagnoses:   Viral syndrome       12/23/2018   [facility] EMERGENCY DEPARTMENT     Christine Mcguire APRN CNP  12/23/18 1717       Christine Mcguire APRN CNP  12/23/18 1727

## 2018-12-23 NOTE — ED AVS SNAPSHOT
[facility] Emergency Department  [address] [address]  Phone:  [phone]  Fax:  [phone]                                    Salome Resendiz   MRN: [id number]    Department:  [facility] Emergency Department   Date of Visit:  12/23/2018           After Visit Summary Signature Page    I have received my discharge instructions, and my questions have been answered. I have discussed any challenges I see with this plan with the nurse or doctor.    ..........................................................................................................................................  Patient/Patient Representative Signature      ..........................................................................................................................................  Patient Representative Print Name and Relationship to Patient    ..................................................               ................................................  Date                                   Time    ..........................................................................................................................................  Reviewed by Signature/Title    ...................................................              ..............................................  Date                                               Time          22EPIC Rev 08/18

## 2018-12-25 LAB
BACTERIA SPEC CULT: NORMAL
Lab: NORMAL
SPECIMEN SOURCE: NORMAL

## 2019-06-23 ENCOUNTER — HOSPITAL ENCOUNTER (EMERGENCY)
Facility: CLINIC | Age: 24
Discharge: HOME | End: 2019-06-23
Attending: NURSE PRACTITIONER | Admitting: NURSE PRACTITIONER
Payer: OTHER MISCELLANEOUS

## 2019-06-23 VITALS
RESPIRATION RATE: 18 BRPM | OXYGEN SATURATION: 100 % | DIASTOLIC BLOOD PRESSURE: 75 MMHG | HEIGHT: 70 IN | SYSTOLIC BLOOD PRESSURE: 119 MMHG | TEMPERATURE: 98.4 F | WEIGHT: 175 LBS | BODY MASS INDEX: 25.05 KG/M2

## 2019-06-23 DIAGNOSIS — S61.213A LACERATION OF LEFT MIDDLE FINGER WITHOUT FOREIGN BODY WITHOUT DAMAGE TO NAIL, INITIAL ENCOUNTER: ICD-10-CM

## 2019-06-23 PROCEDURE — 12001 RPR S/N/AX/GEN/TRNK 2.5CM/<: CPT | Mod: Z6 | Performed by: NURSE PRACTITIONER

## 2019-06-23 PROCEDURE — 12001 RPR S/N/AX/GEN/TRNK 2.5CM/<: CPT | Performed by: NURSE PRACTITIONER

## 2019-06-23 PROCEDURE — G0463 HOSPITAL OUTPT CLINIC VISIT: HCPCS | Performed by: NURSE PRACTITIONER

## 2019-06-23 PROCEDURE — 99213 OFFICE O/P EST LOW 20 MIN: CPT | Mod: 25 | Performed by: NURSE PRACTITIONER

## 2019-06-23 ASSESSMENT — MIFFLIN-ST. JEOR: SCORE: 1634.04

## 2019-06-23 NOTE — ED PROVIDER NOTES
History     Chief Complaint   Patient presents with     Laceration     left 3rd knuckle laceration on metal hollis vent at work     HPI  Salome Resendiz is a 24 year old female who presents with a laceration to the top side of her left middle finger distal to knuckle.  Patient states the injury occurred at work and and she reports a piece of metal hollis venting hit her finger with subsequent laceration.  Patient states she had immediate pain and felt a little lightheaded and dizzy and that resolved within a few minutes.  Patient denies any pain presently.  Patient reports normal range of motion and sensation to the finger and distal to the injury.  Patient reports feeling well otherwise.  Patient denies any other concerns today.  Review of tetanus reveals her last tetanus shot was completed June 13 of 2017.    Allergies:  Allergies   Allergen Reactions     Amoxicillin Rash       Problem List:    There are no active problems to display for this patient.       Past Medical History:    History reviewed. No pertinent past medical history.    Past Surgical History:    History reviewed. No pertinent surgical history.    Family History:    History reviewed. No pertinent family history.    Social History:  Marital Status:  Single [1]  Social History     Tobacco Use     Smoking status: Never Smoker     Smokeless tobacco: Never Used   Substance Use Topics     Alcohol use: Yes     Comment: socially     Drug use: No        Medications:      BLISOVI FE 1.5/30 1.5-30 MG-MCG per tablet   desonide (DESOWEN) 0.05 % lotion   FLUCELVAX QUADRIVALENT 0.5 ML NUBIA   FLUoxetine (PROZAC) 20 MG capsule   hydrOXYzine (ATARAX) 50 MG tablet         Review of Systems  Patient denies fever, aches, chills, sweats, ear pain, eye pain, throat pain, vision changes, chest pain, shortness of air, abdominal pain, nausea, vomiting, diarrhea, difficulty urinating, dysuria, dizziness, seizures, speech difficulty and thoughts of harming self.  All other  "systems were reviewed and are negative.      Physical Exam   BP: 119/75  Heart Rate: 70  Temp: 98.4  F (36.9  C)  Resp: 18  Height: 177.8 cm (5' 10\")  Weight: 79.4 kg (175 lb)  SpO2: 100 %      Physical Exam   Constitutional: She appears well-developed and well-nourished. No distress.   HENT:   Head: Normocephalic and atraumatic.   Right Ear: External ear normal.   Left Ear: External ear normal.   Eyes: Conjunctivae are normal. Right eye exhibits no discharge. Left eye exhibits no discharge.   Cardiovascular: Regular rhythm and normal heart sounds. Exam reveals no friction rub.   No murmur heard.  Pulmonary/Chest: Effort normal and breath sounds normal. No stridor. No respiratory distress. She has no wheezes. She has no rales. She exhibits no tenderness.   Musculoskeletal:        Left hand: She exhibits laceration (1.5 cm laceration obliquely oriented dorsal side, distal to knuckle of left middle finger, proximal to MIP joint). She exhibits normal range of motion, no tenderness, no bony tenderness, normal two-point discrimination, normal capillary refill, no deformity and no swelling. Normal sensation noted. Normal strength noted. She exhibits no finger abduction and no thumb/finger opposition.   Neurological: She is alert.   Skin: Skin is warm and dry. No rash noted. She is not diaphoretic. No erythema. No pallor.   Psychiatric: She has a normal mood and affect.   Nursing note and vitals reviewed.      ED Course        Procedures  [facility] Procedure Note          Laceration Repair:      Laceration repair  Performed by: Christine Mcguire  Authorized by: Christine Mcguire  Consent: Verbal consent obtained.  Risks and benefits: risks, benefits and alternatives were discussed  Patient understanding: patient states understanding of the procedure being performed  Site marked: the operative site was identified  Time out: Immediately prior to procedure a \"time out\" was called to verify the correct patient, procedure, " equipment, support staff and site/side marked as required.    Preparation: Patient was prepped and draped in usual sterile fashion.  Irrigation solution: saline    Body area: left middle finger proximal phalanx, dorsal side, oblique laceration simple without tendon, muscle, or bony involvement  Laceration length: 1.5cm  Contamination: The wound is not contaminated.  Foreign bodies:none  Tendon involvement:none  Anesthesia: Localinfiltration  Local anesthetic: Lidocaine     1% withoutepinephrine and without buffer.  Anesthetic total: 3ml    Debridement: irrigated with water and hibiclens  Skin closure:A total of 3  4-0 Ethylon  Technique: interrupted  Approximation: close  Approximation difficulty: simple      Patient tolerance: Patient tolerated the procedure well with no immediate complications.    No results found for this or any previous visit (from the past 24 hour(s)).    Medications - No data to display    Assessments & Plan (with Medical Decision Making)  Salome Resendiz is a 24 year old female who presents with a laceration to the top side of her left middle finger distal to knuckle as a work related injury.  Exam as noted above there is no bony, tendon, ligamentous involvement noted.  Patient has normal range of motion and is neurovascularly stable.  Laceration repaired with 3 sutures without complications.  Tetanus is not indicated today wound care instructions were provided for patient.  Workability form was filled out.  Recommend patient follow-up next Sunday for suture removal.  Patient discharged in stable condition.     I have reviewed the nursing notes.    I have reviewed the findings, diagnosis, plan and need for follow up with the patient.       Medication List      There are no discharge medications for this visit.         Final diagnoses:   Laceration of left middle finger without foreign body without damage to nail, initial encounter - 3 sutures placed       6/23/2019   [facility]  DARWIN Mcguire, Christine Sutherland, APRN CNP  06/23/19 9351

## 2019-06-23 NOTE — ED AVS SNAPSHOT
[facility] Emergency Department  [address] [address]  Phone:  [phone]  Fax:  [phone]                                    Salome Resendiz   MRN: [id number]    Department:  [facility] Emergency Department   Date of Visit:  6/23/2019           After Visit Summary Signature Page    I have received my discharge instructions, and my questions have been answered. I have discussed any challenges I see with this plan with the nurse or doctor.    ..........................................................................................................................................  Patient/Patient Representative Signature      ..........................................................................................................................................  Patient Representative Print Name and Relationship to Patient    ..................................................               ................................................  Date                                   Time    ..........................................................................................................................................  Reviewed by Signature/Title    ...................................................              ..............................................  Date                                               Time          22EPIC Rev 08/18